# Patient Record
Sex: MALE | Race: BLACK OR AFRICAN AMERICAN | NOT HISPANIC OR LATINO | Employment: FULL TIME | URBAN - METROPOLITAN AREA
[De-identification: names, ages, dates, MRNs, and addresses within clinical notes are randomized per-mention and may not be internally consistent; named-entity substitution may affect disease eponyms.]

---

## 2017-08-11 ENCOUNTER — HOSPITAL ENCOUNTER (OUTPATIENT)
Dept: ADMINISTRATIVE | Facility: HOSPITAL | Age: 57
End: 2017-08-13
Attending: INTERNAL MEDICINE | Admitting: INTERNAL MEDICINE

## 2017-08-12 LAB
ABS NEUT (OLG): 6.42 X10(3)/MCL (ref 2.1–9.2)
ALBUMIN SERPL-MCNC: 3.5 GM/DL (ref 3.4–5)
ALBUMIN/GLOB SERPL: 1 {RATIO}
ALP SERPL-CCNC: 49 UNIT/L (ref 45–117)
ALT SERPL-CCNC: 97 UNIT/L (ref 16–61)
AST SERPL-CCNC: 40 UNIT/L (ref 15–37)
BASOPHILS # BLD AUTO: 0.02 X10(3)/MCL (ref 0–0.2)
BASOPHILS NFR BLD AUTO: 0.2 % (ref 0–0.9)
BILIRUB SERPL-MCNC: 0.8 MG/DL (ref 0.2–1)
BILIRUBIN DIRECT+TOT PNL SERPL-MCNC: 0.2 MG/DL (ref 0–0.2)
BILIRUBIN DIRECT+TOT PNL SERPL-MCNC: 0.6 MG/DL (ref 0–1)
BUN SERPL-MCNC: 50 MG/DL (ref 7–18)
BUN SERPL-MCNC: 59 MG/DL (ref 7–18)
BUN SERPL-MCNC: 66 MG/DL (ref 7–18)
CALCIUM SERPL-MCNC: 8.2 MG/DL (ref 8.5–10.1)
CALCIUM SERPL-MCNC: 8.2 MG/DL (ref 8.5–10.1)
CALCIUM SERPL-MCNC: 8.3 MG/DL (ref 8.5–10.1)
CHLORIDE SERPL-SCNC: 106 MMOL/L (ref 98–107)
CHLORIDE SERPL-SCNC: 107 MMOL/L (ref 98–107)
CHLORIDE SERPL-SCNC: 109 MMOL/L (ref 98–107)
CO2 SERPL-SCNC: 26 MMOL/L (ref 21–32)
CREAT SERPL-MCNC: 1.55 MG/DL (ref 0.7–1.3)
CREAT SERPL-MCNC: 1.95 MG/DL (ref 0.7–1.3)
CREAT SERPL-MCNC: 2.24 MG/DL (ref 0.7–1.3)
EOSINOPHIL # BLD AUTO: 0.28 X10(3)/MCL (ref 0–0.9)
EOSINOPHIL NFR BLD AUTO: 2.8 % (ref 0–6.5)
ERYTHROCYTE [DISTWIDTH] IN BLOOD BY AUTOMATED COUNT: 11.9 % (ref 11.5–17)
EST. AVERAGE GLUCOSE BLD GHB EST-MCNC: 160 MG/DL
GLOBULIN SER-MCNC: 4 GM/DL (ref 2–4)
GLUCOSE SERPL-MCNC: 130 MG/DL (ref 74–106)
GLUCOSE SERPL-MCNC: 192 MG/DL (ref 74–106)
GLUCOSE SERPL-MCNC: 99 MG/DL (ref 74–106)
HBA1C MFR BLD: 7.2 % (ref 4.2–6.3)
HCT VFR BLD AUTO: 29.7 % (ref 42–52)
HGB BLD-MCNC: 9.6 GM/DL (ref 14–18)
IMM GRANULOCYTES # BLD AUTO: 0.04 10*3/UL (ref 0–0.02)
IMM GRANULOCYTES NFR BLD AUTO: 0.4 % (ref 0–0.43)
LYMPHOCYTES # BLD AUTO: 2.7 X10(3)/MCL (ref 0.6–4.6)
LYMPHOCYTES NFR BLD AUTO: 26.9 % (ref 16.2–38.3)
MAGNESIUM SERPL-MCNC: 3 MG/DL (ref 1.8–2.4)
MCH RBC QN AUTO: 30.8 PG (ref 27–31)
MCHC RBC AUTO-ENTMCNC: 32.3 GM/DL (ref 33–36)
MCV RBC AUTO: 95.2 FL (ref 80–94)
MONOCYTES # BLD AUTO: 0.56 X10(3)/MCL (ref 0.1–1.3)
MONOCYTES NFR BLD AUTO: 5.6 % (ref 4.7–11.3)
NEUTROPHILS # BLD AUTO: 6.42 X10(3)/MCL (ref 2.1–9.2)
NEUTROPHILS NFR BLD AUTO: 64.1 % (ref 49.1–73.4)
NRBC BLD AUTO-RTO: 0 % (ref 0–0.2)
PLATELET # BLD AUTO: 186 X10(3)/MCL (ref 130–400)
PMV BLD AUTO: 9.4 FL (ref 7.4–10.4)
POTASSIUM SERPL-SCNC: 5.3 MMOL/L (ref 3.5–5.1)
POTASSIUM SERPL-SCNC: 5.7 MMOL/L (ref 3.5–5.1)
POTASSIUM SERPL-SCNC: 6.2 MMOL/L (ref 3.5–5.1)
PROT SERPL-MCNC: 7 GM/DL (ref 6.4–8.2)
RBC # BLD AUTO: 3.12 X10(6)/MCL (ref 4.7–6.1)
SODIUM SERPL-SCNC: 136 MMOL/L (ref 136–145)
SODIUM SERPL-SCNC: 137 MMOL/L (ref 136–145)
SODIUM SERPL-SCNC: 139 MMOL/L (ref 136–145)
WBC # SPEC AUTO: 10 X10(3)/MCL (ref 4.5–11.5)

## 2017-08-13 LAB
ABS NEUT (OLG): 6.3 X10(3)/MCL (ref 2.1–9.2)
BASOPHILS # BLD AUTO: 0 X10(3)/MCL (ref 0–0.2)
BASOPHILS NFR BLD AUTO: 0 %
BUN SERPL-MCNC: 35 MG/DL (ref 7–18)
CALCIUM SERPL-MCNC: 8.5 MG/DL (ref 8.5–10.1)
CHLORIDE SERPL-SCNC: 106 MMOL/L (ref 98–107)
CHOLEST SERPL-MCNC: 102 MG/DL (ref 0–200)
CHOLEST/HDLC SERPL: 2.7 {RATIO} (ref 0–5)
CK SERPL-CCNC: 108 UNIT/L (ref 39–308)
CO2 SERPL-SCNC: 25 MMOL/L (ref 21–32)
CREAT SERPL-MCNC: 1.14 MG/DL (ref 0.7–1.3)
EOSINOPHIL # BLD AUTO: 0.2 X10(3)/MCL (ref 0–0.9)
EOSINOPHIL NFR BLD AUTO: 2 %
ERYTHROCYTE [DISTWIDTH] IN BLOOD BY AUTOMATED COUNT: 11.6 % (ref 11.5–17)
FOLATE SERPL-MCNC: 16.4 NG/ML (ref 3.1–17.5)
GLUCOSE SERPL-MCNC: 90 MG/DL (ref 74–106)
HAV IGM SERPL QL IA: NORMAL
HBV CORE IGM SERPL QL IA: NORMAL
HBV SURFACE AG SERPL QL IA: NORMAL
HCT VFR BLD AUTO: 29.7 % (ref 42–52)
HCV AB SERPL QL IA: NORMAL
HDLC SERPL-MCNC: 38 MG/DL (ref 35–60)
HGB BLD-MCNC: 9.6 GM/DL (ref 14–18)
IRON SATN MFR SERPL: 73.5 % (ref 20–50)
IRON SERPL-MCNC: 175 MCG/DL (ref 50–175)
LDLC SERPL CALC-MCNC: 49 MG/DL (ref 0–129)
LYMPHOCYTES # BLD AUTO: 3.2 X10(3)/MCL (ref 0.6–4.6)
LYMPHOCYTES NFR BLD AUTO: 31 %
MAGNESIUM SERPL-MCNC: 2.5 MG/DL (ref 1.8–2.4)
MCH RBC QN AUTO: 30.8 PG (ref 27–31)
MCHC RBC AUTO-ENTMCNC: 32.3 GM/DL (ref 33–36)
MCV RBC AUTO: 95.2 FL (ref 80–94)
MONOCYTES # BLD AUTO: 0.5 X10(3)/MCL (ref 0.1–1.3)
MONOCYTES NFR BLD AUTO: 5 %
NEUTROPHILS # BLD AUTO: 6.3 X10(3)/MCL (ref 2.1–9.2)
NEUTROPHILS NFR BLD AUTO: 61 %
PLATELET # BLD AUTO: 188 X10(3)/MCL (ref 130–400)
PMV BLD AUTO: 10.3 FL (ref 9.4–12.4)
POTASSIUM SERPL-SCNC: 4.9 MMOL/L (ref 3.5–5.1)
RBC # BLD AUTO: 3.12 X10(6)/MCL (ref 4.7–6.1)
SODIUM SERPL-SCNC: 138 MMOL/L (ref 136–145)
TIBC SERPL-MCNC: 238 MCG/DL (ref 250–450)
TRANSFERRIN SERPL-MCNC: 203 MG/DL (ref 200–360)
TRIGL SERPL-MCNC: 76 MG/DL (ref 30–150)
VIT B12 SERPL-MCNC: 1112 PG/ML (ref 193–986)
VLDLC SERPL CALC-MCNC: 15 MG/DL
WBC # SPEC AUTO: 10.3 X10(3)/MCL (ref 4.5–11.5)

## 2022-04-10 ENCOUNTER — HISTORICAL (OUTPATIENT)
Dept: ADMINISTRATIVE | Facility: HOSPITAL | Age: 62
End: 2022-04-10

## 2022-04-26 VITALS
WEIGHT: 182 LBS | SYSTOLIC BLOOD PRESSURE: 138 MMHG | BODY MASS INDEX: 29.25 KG/M2 | DIASTOLIC BLOOD PRESSURE: 92 MMHG | HEIGHT: 66 IN

## 2022-04-30 NOTE — DISCHARGE SUMMARY
Patient:   Paco Chow             MRN: [id number]            FIN: [id number]               Age:   57 years     Sex:  Male     :  1960   Associated Diagnoses:   None   Author:   Isaura Briceno MD      Basic Information   Source of history:  Self, Medical record.    Referral source:  Emergency department, Dr. Nazario.    History limitation:  None.       Chief Complaint   abnormal blood work      History of Present Illness   17: Mr. Chow is a 57 year old male with a history of congestive heart failure.  He had routine blood work done by his cardiologist, and he was found to be in acute renal failure and to have hyperkalemia.  He states that he did not have any symptoms like, weakness, shortness of breath, or palpitaions.  17:  He has been getting IV fluids at a very low rate.  No shortness of breath.  No edema.  Kidney function is back to normal.  Electrolytes are normal           Review of Systems   Constitutional:  Fatigue, No fever, No weakness.    Eye:  Negative.    Ear/Nose/Mouth/Throat   Respiratory:  No shortness of breath, No cough, No sputum production.    Cardiovascular:  No chest pain, No palpitations, No tachycardia.    Gastrointestinal:  No nausea, No vomiting, No diarrhea.    Genitourinary:  No dysuria.    Hematology/Lymphatics:  Negative.    Endocrine:  No excessive thirst, No polyuria.    Immunologic:  Negative.    Musculoskeletal:  No joint pain.    Integumentary:  Negative.    Neurologic:  Alert and oriented X4, No confusion, No numbness.    Psychiatric:  No anxiety, No depression.       Health Status   Allergies:    Allergic Reactions (Selected)  No Known Allergies,    Allergies (1) Active Reaction  No Known Allergies None Documented     Current medications:  (Selected)   Inpatient Medications  Ordered  Coreg 3.125 mg oral tablet: 3.125 mg, form: Tab, Oral, BID, first dose 17 21:00:00 CDT, 24,034  Dextrose 50% and Water  (50 mL vial/syringe): 25 mL, 12.5 gm =,  form: Injection, IV Push, As Directed PRN for blood glucose, Infuse over: 5 minute(s), first dose 08/12/17 4:52:00 CDT, Unconscious patient: Repeat as ordered per protocol.  Dextrose 50% and Water  (50 mL vial/syringe): 25 mL, 12.5 gm =, form: Injection, IV Push, Once PRN for blood glucose, Infuse over: 5 minute(s), first dose 08/12/17 4:52:00 CDT, Unconscious patient: Look for other source of altered mental status.  Dextrose 50% and Water  (50 mL vial/syringe): 50 mL, 25 gm =, form: Injection, IV Push, As Directed PRN for blood glucose, Infuse over: 5 minute(s), first dose 08/12/17 4:52:00 CDT, Unconscious patient: Repeat as ordered per protocol.  Dextrose 50% in Water: 25 mL, 12.5 gm =, form: Injection, IV Push, As Directed PRN for blood glucose, Infuse over: 5 minute(s), first dose 08/12/17 4:52:00 CDT, Conscious patient.  Eliquis: 5 mg, form: Tab, Oral, BID, first dose 08/12/17 21:00:00 CDT, 24,034  Flomax 0.4 mg oral capsule: 0.4 mg, form: Cap, Oral, qPM, first dose 08/12/17 21:00:00 CDT, 26,038  Lasix: 20 mg, form: Tab, Oral, Daily, first dose 08/15/17 9:00:00 CDT, 23  Lipitor: 20 mg, form: Tab, Oral, At Bedtime, first dose 08/13/17 21:00:00 CDT, 62,027  Protonix: 40 mg, form: Tab-EC, Oral, Daily, first dose 08/13/17 6:00:00 CDT, 66,022  aspirin 81 mg oral Delayed Release (EC) tablet: 81 mg, form: Tab-EC, Oral, Daily, first dose 08/13/17 9:00:00 CDT, 23  cloNIDine: 0.2 mg, form: Tab, Oral, q8hr PRN for hypertension, first dose 08/12/17 4:52:00 CDT, SBP > _160___, 30,023  glimepiride 1 mg oral tablet: 1 mg, form: Tab, Oral, BID, first dose 08/13/17 21:00:00 CDT, 24,034  glucagon: 1 mg, form: Injection, IM, q10min PRN for blood glucose, first dose 08/12/17 4:52:00 CDT, Conscious Patient with NO IV access available and BG < 45 mg/dl., 58  glucagon: 1 mg, form: Injection, IM, q10min PRN for blood glucose, first dose 08/12/17 4:52:00 CDT, Unconscious patient: Patient with NO IV access available and BG < 70 mg/dl.,  58  insulin lispro: 2-14 units, form: Injection, Subcutaneous, As Directed PRN for blood glucose, first dose 08/12/17 4:52:00 CDT  lisinopril 5 mg oral tablet: 2.5 mg, form: Tab, Oral, Daily, first dose 08/14/17 9:00:00 CDT, 23  Pending Complete  Ancef: 1 gm, form: Injection, IV Piggyback, q8hr, Infuse over: 30 minute(s), Order duration: 2 dose(s), first dose 10/10/16 17:00:00 CDT, stop date 10/11/16 8:59:00 CDT, 30,023  Prescriptions  Prescribed  Coreg 3.125 mg oral tablet: 3.125 mg = 1 tab(s), Oral, BID, # 60 tab(s), 4 Refill(s)  Eliquis 5 mg oral tablet: 5 mg = 1 tab(s), Oral, BID, # 60 tab(s), 0 Refill(s)  Lasix 20 mg oral tablet: 20 mg = 1 tab(s), Oral, Daily, # 30 tab(s), 4 Refill(s)  Lipitor 20 mg oral tablet: 20 mg = 1 tab(s), Oral, At Bedtime, # 30 tab(s), 4 Refill(s)  Protonix 40 mg ORAL enteric coated tablet: 40 mg = 1 tab(s), Oral, Daily, # 30 tab(s), 4 Refill(s)  glimepiride 1 mg oral tablet: 1 mg = 1 tab(s), Oral, BID, # 60 tab(s), 4 Refill(s)  lisinopril 2.5 mg oral tablet: 2.5 mg = 1 tab(s), Oral, Daily, # 30 tab(s), 4 Refill(s)  Documented Medications  Documented  aspirin 81 mg oral Delayed Release (EC) tablet: 81 mg = 1 tab(s), Oral, Daily, 0 Refill(s)  tamsulosin 0.4 mg oral capsule: 0.4 mg = 1 cap(s), Oral, Daily, 0 Refill(s),    Medications (17) Active  Scheduled: (9)  apixaban 5 mg Tab  5 mg 1 tab(s), Oral, BID  aspirin 81 mg EC Tab  81 mg 1 tab(s), Oral, Daily  atorvastatin 20 mg Tab  20 mg 1 tab(s), Oral, At Bedtime  carvedilol 3.125 mg Tab UD  3.125 mg 1 tab(s), Oral, BID  furosemide 20 mg tab UD  20 mg 1 tab(s), Oral, Daily  glimepiride 1 mg Tab  1 mg 1 tab(s), Oral, BID  lisinopril 5 mg Tab UD  2.5 mg 0.5 tab(s), Oral, Daily  pantoprazole 40 mg EC Tab  40 mg 1 tab(s), Oral, Daily  tamsulosin 0.4 mg Cap  0.4 mg 1 cap(s), Oral, qPM  Continuous: (0)  PRN: (8)  cloniDINE 0.1 mg Tab UD  0.2 mg 2 tab(s), Oral, q8hr  dextrose 50% abboj  12.5 gm 25 mL, IV Push, As Directed  dextrose 50% abboj   12.5 gm 25 mL, IV Push, Once  dextrose 50% abboj  12.5 gm 25 mL, IV Push, As Directed  dextrose 50% abboj  25 gm 50 mL, IV Push, As Directed  glucagon recombinant 1 mg Inj  1 mg 1 EA, IM, q10min  glucagon recombinant 1 mg Inj  1 mg 1 EA, IM, q10min  insulin (Humalog) lispro 100 u/ml Inj  2-14 units, Subcutaneous, As Directed     Problem list:    All Problems  CHF (congestive heart failure) / SNOMED CT [id number] / Confirmed  CVA (cerebral vascular accident) / SNOMED CT [id number] / Confirmed  Diabetes / SNOMED CT [id number] / Confirmed  Hypertension / SNOMED CT [id number] / Confirmed  Ventricular tachycardia / SNOMED CT [id number] / Confirmed  Canceled: Benign hypertension / SNOMED CT [id number],    Active Problems (5)  CHF (congestive heart failure)   CVA (cerebral vascular accident)   Diabetes   Hypertension   Ventricular tachycardia         Histories   Family History:    Primary malignant neoplasm of breast  Mother  Diabetes mellitus type 2  Mother  Asthma.  Brother  Hypertension.  Mother  Renal failure syndrome.  Mother     Procedure history:    Colonoscopy ([id number]).  Comments:  5/1/2016 00:07 - Torin ROLLINS, Madai MOISE  in 2015  pacemaker.   Social History        Social & Psychosocial Habits    Alcohol  04/30/2016  Use: Never    Employment/School  04/30/2016  Status: Employed    Home/Environment  04/30/2016  Lives with: Spouse    Alcohol abuse in household: No    Substance abuse in household: No    Smoker in household: No    Injuries/Abuse/Neglect in household: No    Feels unsafe at home: No    Safe place to go: Yes    Agency(s)/Others notified: No    Family/Friends available to help: No    Concern for family members at home: No    Nutrition/Health  04/30/2016  Type of diet: Regular    Substance Abuse  04/30/2016  Use: Never    Tobacco  04/30/2016  Use: Never smoker  .        Physical  Examination   General:  Alert and oriented, No acute distress.    Eye:  Extraocular movements are intact, Normal conjunctiva.    HENT:  Normocephalic.    Neck:  Supple, Non-tender, No jugular venous distention.    Respiratory:  Lungs are clear to auscultation, Respirations are non-labored.    Cardiovascular:  Normal rate, Regular rhythm, No edema.    Gastrointestinal:  Soft, Non-tender, Non-distended, Normal bowel sounds.       Vital Signs (last 24 hrs)_____  Last Charted___________  Temp Oral     36.3 DegC  (AUG 13 11:00)  Resp Rate         18 br/min  (AUG 13 11:00)  SBP      113 mmHg  (AUG 13 11:00)  DBP      L 56mmHg  (AUG 13 11:00)  SpO2      100 %  (AUG 13 11:00)     Musculoskeletal:  Normal range of motion, Normal strength, No swelling.    Integumentary:  Warm, Dry.    Neurologic:  Alert, Oriented, No focal deficits.    Psychiatric:  Cooperative, Appropriate mood & affect.    Cognition and Speech:  Oriented, Speech clear and coherent.       Health Maintenance      Health Maintenance     Pending (in the next year)        Due            Alcohol Misuse Screening due  08/13/17  and every 1  year(s)           Colorectal Screening due  08/13/17  Variable frequency           Depression Screening due  08/13/17  and every 1  year(s)           HIV Screening due  08/13/17  and every 1  year(s)           Tetanus Vaccine due  08/13/17  and every 10  year(s)        Due In Future            Influenza Vaccine not due until  10/02/17  and every 1  year(s)           Body Mass Index Check not due until  10/10/17  and every 1  year(s)           Obesity Screening not due until  10/10/17  and every 1  year(s)     Satisfied (in the past 1 year)        Satisfied            Aspirin Therapy for CVD Prevention on  08/13/17.  Satisfied by Cynthia New RN           Blood Pressure Screening on  08/13/17.  Satisfied by Chanel Hernández C.N.A.           Body Mass Index Check on  10/10/16.  Satisfied by Shannan Martínez RN           Diabetes  Screening on  17.  Satisfied by Katrina Navarro.           Lipid Screening on  17.  Satisfied by Katrina Navarro.           Obesity Screening on  10/10/16.  Satisfied by Mauricio RNShannan          Review / Management   Results review:     Labs (Last four charted values)  Glucose              90 (AUG 13) H 130 (AUG 12) 99 (AUG 12) H 192 (AUG 12) .       Impression and Plan   1. Chronic systolic CHF:  He feels well.  He does not appear to be in any volume overload.  Will resume low dose betablocker and ace-i  2. Acute renal failure: resolved after hydration and decrease in lasix  3.  Hyperkalemia: resolved  4.  DM2:  hgba1c is 7.2. Will change glimeperide to 1mg bid  5.  Volume depletion: resolving    Followup with Dr. Nazario in 1-2 weeks    Total time with discharge plannin minutes  Discharge to home in improved condition  Discharge activity: as tolerated  Discharge diet: diabetic/cardiac  Discharge meds: see discharge med rec

## 2022-04-30 NOTE — ED PROVIDER NOTES
Patient:   Paco Chow             MRN: [id number]            FIN: [id number]               Age:   57 years     Sex:  Male     :  1960   Associated Diagnoses:   Renal failure; Hyperkalemia; Dehydration   Author:   Pavel Cavazos MD      Basic Information   Time seen: Immediately upon arrival.   History source: Patient.   Arrival mode: Private vehicle, walking.   History limitation: None.   Additional information: Chief Complaint from Nursing Triage Note : Chief Complaint   2017 21:44 CDT      Chief Complaint           Denies symptoms; routine labs showed elevated potassium, BUN, and creatinine; reffered by MD Nazario  .      History of Present Illness   The patient presents with abnormal lab test and hyperkalemia.  The onset was unknown.  Lab test value K: 6.2 mEq/L, Lab test was performed by: primary care physician, Patient was notified of lab results by: doctor's office Cr 2.4.  Associated symptoms: none.  Risk factors consist of diabetes mellitus and hypertension.  Therapy today: none.  pt found to have elevated potassium and creatnine on routine blood work. he wa sent to ED for hydration. pt does not have any complaints. he was instructed to stop aldactone, lisinopril and potassium as well.        Review of Systems   Constitutional symptoms:  Negative except as documented in HPI.   Skin symptoms:  Negative except as documented in HPI.   Eye symptoms:  Negative except as documented in HPI.   ENMT symptoms:  Negative except as documented in HPI.   Respiratory symptoms:  Negative except as documented in HPI.   Cardiovascular symptoms:  Negative except as documented in HPI.   Gastrointestinal symptoms:  Negative except as documented in HPI.   Genitourinary symptoms:  Negative except as documented in HPI.   Musculoskeletal symptoms:  Negative except as documented in HPI.   Neurologic symptoms:  Negative except as documented in HPI.   Psychiatric symptoms:  Negative except as documented  in HPI.   Endocrine symptoms:  Negative except as documented in HPI.   Hematologic/Lymphatic symptoms:  Negative except as documented in HPI.   Allergy/immunologic symptoms:  Negative except as documented in HPI.             Additional review of systems information: All other systems reviewed and otherwise negative.      Health Status   Allergies:    Allergic Reactions (Selected)  No Known Allergies,    Allergies (1) Active Reaction  No Known Allergies None Documented  .   Medications:  (Selected)   Inpatient Medications  Pending Complete  Ancef: 1 gm, form: Injection, IV Piggyback, q8hr, Infuse over: 30 minute(s), Order duration: 2 dose(s), first dose 10/10/16 17:00:00 CDT, stop date 10/11/16 8:59:00 CDT, 30,023  Prescriptions  Prescribed  Eliquis 5 mg oral tablet: 5 mg = 1 tab(s), Oral, BID, # 60 tab(s), 0 Refill(s)  Lipitor 40 mg oral tablet: 40 mg = 1 tab(s), Oral, Daily, # 30 tab(s), 1 Refill(s)  glimepiride 1 mg oral tablet: 1 mg = 1 tab(s), Oral, Daily, # 30 tab(s), 1 Refill(s)  Documented Medications  Documented  Lasix 40 mg oral tablet: 40 mg = 1 tab(s), Oral, Daily, # 30 tab(s), 0 Refill(s)  aspirin 81 mg oral Delayed Release (EC) tablet: 81 mg = 1 tab(s), Oral, Daily, 0 Refill(s)  carvedilol 12.5 mg oral tablet: 12.5 mg = 1 tab(s), Oral, BID, # 60 tab(s), 0 Refill(s)  lisinopril 40 mg oral tablet: 40 mg = 1 tab(s), Oral, Daily, # 30 tab(s), 0 Refill(s)  potassium chloride 10 mEq oral TABLET extended release: 10 mEq = 1 tab(s), Oral, BID, 0 Refill(s)  tamsulosin 0.4 mg oral capsule: 0.4 mg = 1 cap(s), Oral, Daily, 0 Refill(s).      Past Medical/ Family/ Social History   Family history:    Primary malignant neoplasm of breast  Mother  Diabetes mellitus type 2  Mother  Asthma.  Brother  Hypertension.  Mother  Renal failure syndrome.  Mother  .   Social history: Alcohol use: Denies, Tobacco use: Denies, Drug use: Denies.      Physical Examination               Vital Signs   Vital Signs   8/11/2017 21:44 CDT       Temperature Oral          36.8 DegC                             Peripheral Pulse Rate     74 bpm                             Respiratory Rate          17 br/min                             SpO2                      97 %                             Oxygen Therapy            Room air                             Systolic Blood Pressure   105 mmHg                             Diastolic Blood Pressure  54 mmHg  LOW  .   Measurements   8/11/2017 21:44 CDT      Weight Dosing             81 kg                             Weight Measured and Calculated in Lbs     178.57 lb                             Weight Estimated          81 kg                             Height/Length Estimated   172 cm                             Body Mass Index Estimated 27.38 kg/m2  .   General:  Alert, no acute distress.    Skin:  Warm, dry, intact, no rash.    Head:  Atraumatic.   Neck:  Supple.   Eye:  Normal conjunctiva.   Ears, nose, mouth and throat:  Oral mucosa moist.   Cardiovascular:  Regular rate and rhythm, No murmur, Normal peripheral perfusion, No edema.    Respiratory:  Lungs are clear to auscultation, respirations are non-labored.    Gastrointestinal:  Soft, Nontender, Non distended, Normal bowel sounds, Guarding: Negative, Rebound: Negative.    Musculoskeletal:  Normal ROM, normal strength.    Neurological:  Alert and oriented to person, place, time, and situation, No focal neurological deficit observed.       Medical Decision Making   Documents reviewed:  Emergency department nurses' notes, prior records.    Orders  Launch Orders   Laboratory:  BMP (Order): Stat collect, 8/11/2017 23:45 CDT, Blood, Lab Collect, 8/11/2017 23:45 CDT, Launch Orders   Admit/Transfer/Discharge:  Admit to Outpatient Observation (Order): 8/12/2017 3:54 CDT, Briceno Isaura ARENAS  Medical Unit, No, Launch Orders   Pharmacy:  Kayexalate (Order): 30 gm, form: Susp, Oral, Once, first dose 8/12/2017 4:57 CDT, stop date 8/12/2017 4:57 CDT, 24.    Cardiac monitor:   Normal sinus rhythm.   Electrocardiogram:  Time 8/12/2017 04:01:00, rate 61, normal sinus rhythm, No ST-T changes, no ectopy, normal [place] & QRS intervals, EP Interp.    Results review:  Lab results : Lab View   8/12/2017 3:22 CDT       Potassium Lvl             6.2 mmol/L  HI                             Creatinine                1.95 mg/dL  HI    8/12/2017 0:02 CDT       Potassium Lvl             5.7 mmol/L  HI                             Creatinine                2.24 mg/dL  HI    8/11/2017 18:20 CDT      Potassium Lvl             6.12 mmol/L  HI                             Creatinine                2.41 mg/dL  HI  .      Reexamination/ Reevaluation   Time: 8/12/2017 02:10:00 .   Notes: no resp distress. lungs clear.   Time: 8/12/2017 03:50:00 .   Notes: pt's renal function improved but his potassium has increased after 1L NS. pt will be admitted for continued hydration.      Impression and Plan   Diagnosis   Renal failure (YPX76-FS N19)   Hyperkalemia (DGA67-FU E87.5)   Dehydration (NHW51-MI E86.0)      Calls-Consults   -  8/12/2017 00:44:00 , Anurag Duff, recommends goes to medicine.    -  8/12/2017 03:56:00 , Janak ARENAS, Isaura MOISE, recommends accepts.    Plan   Condition: Stable.    Disposition: Admit time  8/12/2017 03:56:00, Place in Observation Unit.

## 2022-04-30 NOTE — H&P
Patient:   Paco Chow             MRN: [id number]            FIN: [id number]               Age:   57 years     Sex:  Male     :  1960   Associated Diagnoses:   None   Author:   Isaura Briceno MD      Basic Information   Source of history:  Self, Medical record.    Referral source:  Emergency department, Dr. Nazario.    History limitation:  None.       Chief Complaint   abnormal blood work      History of Present Illness   Mr. Chow is a 57 year old male with a history of congestive heart failure.  He had routine blood work done by his cardiologist, and he was found to be in acute renal failure and to have hyperkalemia.  He states that he did not have any symptoms like, weakness, shortness of breath, or palpitaions.      Review of Systems   Constitutional:  Fatigue, No fever, No weakness.    Eye:  Negative.    Ear/Nose/Mouth/Throat   Respiratory:  No shortness of breath, No cough, No sputum production.    Cardiovascular:  No chest pain, No palpitations, No tachycardia.    Gastrointestinal:  No nausea, No vomiting, No diarrhea.    Genitourinary:  No dysuria.    Hematology/Lymphatics:  Negative.    Endocrine:  No excessive thirst, No polyuria.    Immunologic:  Negative.    Musculoskeletal:  No joint pain.    Integumentary:  Negative.    Neurologic:  Alert and oriented X4, No confusion, No numbness.    Psychiatric:  No anxiety, No depression.       Health Status   Allergies:    Allergic Reactions (Selected)  No Known Allergies,    Allergies (1) Active Reaction  No Known Allergies None Documented     Current medications:  (Selected)   Inpatient Medications  Ordered  1/2 Normal Saline (0.45% NS) 1,000 mL: 1,000 mL, 1,000 mL, IV, 50 mL/hr, start date 17 20:07:00 CDT  Coreg 3.125 mg oral tablet: 3.125 mg, form: Tab, Oral, BID, first dose 17 21:00:00 CDT, 24,034  Dextrose 50% and Water  (50 mL vial/syringe): 25 mL, 12.5 gm =, form: Injection, IV Push, As Directed PRN for blood glucose,  Infuse over: 5 minute(s), first dose 08/12/17 4:52:00 CDT, Unconscious patient: Repeat as ordered per protocol.  Dextrose 50% and Water  (50 mL vial/syringe): 25 mL, 12.5 gm =, form: Injection, IV Push, Once PRN for blood glucose, Infuse over: 5 minute(s), first dose 08/12/17 4:52:00 CDT, Unconscious patient: Look for other source of altered mental status.  Dextrose 50% and Water  (50 mL vial/syringe): 50 mL, 25 gm =, form: Injection, IV Push, As Directed PRN for blood glucose, Infuse over: 5 minute(s), first dose 08/12/17 4:52:00 CDT, Unconscious patient: Repeat as ordered per protocol.  Dextrose 50% in Water: 25 mL, 12.5 gm =, form: Injection, IV Push, As Directed PRN for blood glucose, Infuse over: 5 minute(s), first dose 08/12/17 4:52:00 CDT, Conscious patient.  Eliquis: 5 mg, form: Tab, Oral, BID, first dose 08/12/17 21:00:00 CDT, 24,034  Flomax 0.4 mg oral capsule: 0.4 mg, form: Cap, Oral, qPM, first dose 08/12/17 21:00:00 CDT, 26,038  Lipitor: 40 mg, form: Tab, Oral, At Bedtime, first dose 08/12/17 21:00:00 CDT, 62,027  Protonix: 40 mg, form: Tab-EC, Oral, Daily, first dose 08/13/17 6:00:00 CDT, 66,022  aspirin 81 mg oral Delayed Release (EC) tablet: 81 mg, form: Tab-EC, Oral, Daily, first dose 08/13/17 9:00:00 CDT, 23  cloNIDine: 0.2 mg, form: Tab, Oral, q8hr PRN for hypertension, first dose 08/12/17 4:52:00 CDT, SBP > _160___, 30,023  glimepiride 1 mg oral tablet: 1 mg, form: Tab, Oral, Daily, first dose 08/13/17 9:00:00 CDT, 23  glucagon: 1 mg, form: Injection, IM, q10min PRN for blood glucose, first dose 08/12/17 4:52:00 CDT, Conscious Patient with NO IV access available and BG < 45 mg/dl., 58  glucagon: 1 mg, form: Injection, IM, q10min PRN for blood glucose, first dose 08/12/17 4:52:00 CDT, Unconscious patient: Patient with NO IV access available and BG < 70 mg/dl., 58  insulin lispro: 2-14 units, form: Injection, Subcutaneous, As Directed PRN for blood glucose, first dose 08/12/17 4:52:00 CDT  Pending  Complete  Ancef: 1 gm, form: Injection, IV Piggyback, q8hr, Infuse over: 30 minute(s), Order duration: 2 dose(s), first dose 10/10/16 17:00:00 CDT, stop date 10/11/16 8:59:00 CDT, 30,023  Prescriptions  Prescribed  Eliquis 5 mg oral tablet: 5 mg = 1 tab(s), Oral, BID, # 60 tab(s), 0 Refill(s)  Lipitor 40 mg oral tablet: 40 mg = 1 tab(s), Oral, Daily, # 30 tab(s), 1 Refill(s)  glimepiride 1 mg oral tablet: 1 mg = 1 tab(s), Oral, Daily, # 30 tab(s), 1 Refill(s)  Documented Medications  Documented  Lasix 40 mg oral tablet: 40 mg = 1 tab(s), Oral, Daily, # 30 tab(s), 0 Refill(s)  aspirin 81 mg oral Delayed Release (EC) tablet: 81 mg = 1 tab(s), Oral, Daily, 0 Refill(s)  carvedilol 12.5 mg oral tablet: 12.5 mg = 1 tab(s), Oral, BID, # 60 tab(s), 0 Refill(s)  lisinopril 40 mg oral tablet: 40 mg = 1 tab(s), Oral, Daily, # 30 tab(s), 0 Refill(s)  potassium chloride 10 mEq oral TABLET extended release: 10 mEq = 1 tab(s), Oral, Daily, 0 Refill(s)  tamsulosin 0.4 mg oral capsule: 0.4 mg = 1 cap(s), Oral, Daily, 0 Refill(s),    Medications (16) Active  Scheduled: (7)  apixaban 5 mg Tab  5 mg 1 tab(s), Oral, BID  aspirin 81 mg EC Tab  81 mg 1 tab(s), Oral, Daily  atorvastatin 20 mg Tab  40 mg 2 tab(s), Oral, At Bedtime  carvedilol 3.125 mg Tab UD  3.125 mg 1 tab(s), Oral, BID  glimepiride 1 mg Tab  1 mg 1 tab(s), Oral, Daily  pantoprazole 40 mg EC Tab  40 mg 1 tab(s), Oral, Daily  tamsulosin 0.4 mg Cap  0.4 mg 1 cap(s), Oral, qPM  Continuous: (1)  sodium chloride 0.45% 1,000 mL  1,000 mL, IV, 50 mL/hr  PRN: (8)  cloniDINE 0.1 mg Tab UD  0.2 mg 2 tab(s), Oral, q8hr  dextrose 50% abboj  12.5 gm 25 mL, IV Push, As Directed  dextrose 50% abboj  12.5 gm 25 mL, IV Push, Once  dextrose 50% abboj  12.5 gm 25 mL, IV Push, As Directed  dextrose 50% abboj  25 gm 50 mL, IV Push, As Directed  glucagon recombinant 1 mg Inj  1 mg 1 EA, IM, q10min  glucagon recombinant 1 mg Inj  1 mg 1 EA, IM, q10min  insulin (Humalog) lispro 100 u/ml Inj  2-14  units, Subcutaneous, As Directed     Problem list:    All Problems  CHF (congestive heart failure) / SNOMED CT [id number] / Confirmed  CVA (cerebral vascular accident) / SNOMED CT [id number] / Confirmed  Diabetes / SNOMED CT [id number] / Confirmed  Hypertension / SNOMED CT [id number] / Confirmed  Ventricular tachycardia / SNOMED CT [id number] / Confirmed,    Active Problems (5)  CHF (congestive heart failure)   CVA (cerebral vascular accident)   Diabetes   Hypertension   Ventricular tachycardia         Histories   Family History:    Primary malignant neoplasm of breast  Mother  Diabetes mellitus type 2  Mother  Asthma.  Brother  Hypertension.  Mother  Renal failure syndrome.  Mother     Procedure history:    Colonoscopy ([id number]).  Comments:  5/1/2016 00:07 - Torin ROLLINS, Madai MOISE  in 2015  pacemaker.   Social History        Social & Psychosocial Habits    Alcohol  04/30/2016  Use: Never    Employment/School  04/30/2016  Status: Employed    Home/Environment  04/30/2016  Lives with: Spouse    Alcohol abuse in household: No    Substance abuse in household: No    Smoker in household: No    Injuries/Abuse/Neglect in household: No    Feels unsafe at home: No    Safe place to go: Yes    Agency(s)/Others notified: No    Family/Friends available to help: No    Concern for family members at home: No    Nutrition/Health  04/30/2016  Type of diet: Regular    Substance Abuse  04/30/2016  Use: Never    Tobacco  04/30/2016  Use: Never smoker  .        Physical Examination   General:  Alert and oriented, No acute distress.    Eye:  Extraocular movements are intact, Normal conjunctiva.    HENT:  Normocephalic.    Neck:  Supple, Non-tender, No jugular venous distention.    Respiratory:  Lungs are clear to auscultation, Respirations are non-labored.    Cardiovascular:  Normal rate, Regular rhythm, No  edema.    Gastrointestinal:  Soft, Non-tender, Non-distended, Normal bowel sounds.       Vital Signs (last 24 hrs)_____  Last Charted___________  Temp Oral     36.5 DegC  (AUG 12 16:00)  Heart Rate Peripheral   65 bpm  (AUG 12 03:57)  Resp Rate         18 br/min  (AUG 12 16:00)  SBP      106 mmHg  (AUG 12 16:00)  DBP      62 mmHg  (AUG 12 16:00)  SpO2      99 %  (AUG 12 16:00)     Musculoskeletal:  Normal range of motion, Normal strength, No swelling.    Integumentary:  Warm, Dry.    Neurologic:  Alert, Oriented, No focal deficits.    Psychiatric:  Cooperative, Appropriate mood & affect.    Cognition and Speech:  Oriented, Speech clear and coherent.       Health Maintenance      Health Maintenance     Pending (in the next year)        Due            Alcohol Misuse Screening due  08/12/17  and every 1  year(s)           Colorectal Screening due  08/12/17  Variable frequency           Depression Screening due  08/12/17  and every 1  year(s)           HIV Screening due  08/12/17  and every 1  year(s)           Tetanus Vaccine due  08/12/17  and every 10  year(s)        Due In Future            Influenza Vaccine not due until  10/02/17  and every 1  year(s)           Body Mass Index Check not due until  10/10/17  and every 1  year(s)           Obesity Screening not due until  10/10/17  and every 1  year(s)     Satisfied (in the past 1 year)        Satisfied            Aspirin Therapy for CVD Prevention on  08/13/17.  Satisfied by Isaura Briceno MD           Blood Pressure Screening on  08/12/17.  Satisfied by Chanel Hernández C.N.A.           Body Mass Index Check on  10/10/16.  Satisfied by Shannan Martínez RN           Diabetes Screening on  08/13/17.  Satisfied by Isaura Briceno MD           Obesity Screening on  10/10/16.  Satisfied by Shannan Martínez RN          Review / Management   Results review:     Labs (Last four charted values)  Glucose              H 130 (AUG 12) 99 (AUG 12) H 192 (AUG 12) .       Impression and  Plan   1. Chronic systolic CHF:  He feels well.  He does not appear to be in any volume overload  2. Acute renal failure: improving.  diuretics and ace-inhibitor are on hold for now.  We are hydrating very slowly  3.  Hyperkalemia: resolved  4. elevated lft's:  willl check lipid panel, CPK, and hepatitis panel  5. macrocytic anemia: will check folate and B12  6.  History of DM2: stable at this time. will resume home meds

## 2022-10-19 ENCOUNTER — HOSPITAL ENCOUNTER (EMERGENCY)
Facility: HOSPITAL | Age: 62
Discharge: HOME | End: 2022-10-19
Attending: EMERGENCY MEDICINE
Payer: COMMERCIAL

## 2022-10-19 VITALS
TEMPERATURE: 99 F | BODY MASS INDEX: 25.76 KG/M2 | DIASTOLIC BLOOD PRESSURE: 88 MMHG | HEIGHT: 68 IN | RESPIRATION RATE: 18 BRPM | OXYGEN SATURATION: 99 % | HEART RATE: 88 BPM | WEIGHT: 170 LBS | SYSTOLIC BLOOD PRESSURE: 142 MMHG

## 2022-10-19 DIAGNOSIS — R10.9 ABDOMINAL PAIN: ICD-10-CM

## 2022-10-19 DIAGNOSIS — R10.13 ABDOMINAL PAIN, ACUTE, EPIGASTRIC: Primary | ICD-10-CM

## 2022-10-19 LAB
ALBUMIN SERPL-MCNC: 4.3 GM/DL (ref 3.4–4.8)
ALBUMIN/GLOB SERPL: 1.1 RATIO (ref 1.1–2)
ALP SERPL-CCNC: 95 UNIT/L (ref 40–150)
ALT SERPL-CCNC: 149 UNIT/L (ref 0–55)
APPEARANCE UR: CLEAR
AST SERPL-CCNC: 176 UNIT/L (ref 5–34)
BACTERIA #/AREA URNS AUTO: NORMAL /HPF
BASOPHILS # BLD AUTO: 0.03 X10(3)/MCL (ref 0–0.2)
BASOPHILS NFR BLD AUTO: 0.2 %
BILIRUB UR QL STRIP.AUTO: NEGATIVE MG/DL
BILIRUBIN DIRECT+TOT PNL SERPL-MCNC: 3.9 MG/DL
BNP BLD-MCNC: 62.1 PG/ML
BUN SERPL-MCNC: 12.6 MG/DL (ref 8.4–25.7)
CALCIUM SERPL-MCNC: 9.4 MG/DL (ref 8.8–10)
CHLORIDE SERPL-SCNC: 100 MMOL/L (ref 98–107)
CO2 SERPL-SCNC: 28 MMOL/L (ref 23–31)
COLOR UR AUTO: YELLOW
CREAT SERPL-MCNC: 0.82 MG/DL (ref 0.73–1.18)
EOSINOPHIL # BLD AUTO: 0 X10(3)/MCL (ref 0–0.9)
EOSINOPHIL NFR BLD AUTO: 0 %
ERYTHROCYTE [DISTWIDTH] IN BLOOD BY AUTOMATED COUNT: 12.7 % (ref 11.5–17)
GFR SERPLBLD CREATININE-BSD FMLA CKD-EPI: >60 MLS/MIN/1.73/M2
GLOBULIN SER-MCNC: 3.8 GM/DL (ref 2.4–3.5)
GLUCOSE SERPL-MCNC: 328 MG/DL (ref 82–115)
GLUCOSE UR QL STRIP.AUTO: >=1000 MG/DL
HCT VFR BLD AUTO: 41.9 % (ref 42–52)
HGB BLD-MCNC: 13.6 GM/DL (ref 14–18)
IMM GRANULOCYTES # BLD AUTO: 0.03 X10(3)/MCL (ref 0–0.04)
IMM GRANULOCYTES NFR BLD AUTO: 0.2 %
KETONES UR QL STRIP.AUTO: NEGATIVE MG/DL
LEUKOCYTE ESTERASE UR QL STRIP.AUTO: NEGATIVE UNIT/L
LIPASE SERPL-CCNC: 22 U/L
LYMPHOCYTES # BLD AUTO: 0.64 X10(3)/MCL (ref 0.6–4.6)
LYMPHOCYTES NFR BLD AUTO: 5 %
MCH RBC QN AUTO: 30 PG (ref 27–31)
MCHC RBC AUTO-ENTMCNC: 32.5 MG/DL (ref 33–36)
MCV RBC AUTO: 92.3 FL (ref 80–94)
MONOCYTES # BLD AUTO: 0.47 X10(3)/MCL (ref 0.1–1.3)
MONOCYTES NFR BLD AUTO: 3.7 %
NEUTROPHILS # BLD AUTO: 11.6 X10(3)/MCL (ref 2.1–9.2)
NEUTROPHILS NFR BLD AUTO: 90.9 %
NITRITE UR QL STRIP.AUTO: NEGATIVE
NRBC BLD AUTO-RTO: 0 %
PH UR STRIP.AUTO: 7.5 [PH]
PLATELET # BLD AUTO: 168 X10(3)/MCL (ref 130–400)
PMV BLD AUTO: 11.4 FL (ref 7.4–10.4)
POTASSIUM SERPL-SCNC: 4.5 MMOL/L (ref 3.5–5.1)
PROT SERPL-MCNC: 8.1 GM/DL (ref 5.8–7.6)
PROT UR QL STRIP.AUTO: NEGATIVE MG/DL
RBC # BLD AUTO: 4.54 X10(6)/MCL (ref 4.7–6.1)
RBC #/AREA URNS AUTO: NORMAL /HPF
RBC UR QL AUTO: ABNORMAL UNIT/L
SARS-COV-2 RDRP RESP QL NAA+PROBE: NEGATIVE
SODIUM SERPL-SCNC: 138 MMOL/L (ref 136–145)
SP GR UR STRIP.AUTO: 1.01
SQUAMOUS #/AREA URNS AUTO: NORMAL /HPF
TROPONIN I SERPL-MCNC: <0.01 NG/ML (ref 0–0.04)
UROBILINOGEN UR STRIP-ACNC: 2 MG/DL
WBC # SPEC AUTO: 12.7 X10(3)/MCL (ref 4.5–11.5)
WBC #/AREA URNS AUTO: NORMAL /HPF

## 2022-10-19 PROCEDURE — 84484 ASSAY OF TROPONIN QUANT: CPT | Performed by: EMERGENCY MEDICINE

## 2022-10-19 PROCEDURE — 36415 COLL VENOUS BLD VENIPUNCTURE: CPT | Performed by: EMERGENCY MEDICINE

## 2022-10-19 PROCEDURE — 96372 THER/PROPH/DIAG INJ SC/IM: CPT | Performed by: EMERGENCY MEDICINE

## 2022-10-19 PROCEDURE — 83880 ASSAY OF NATRIURETIC PEPTIDE: CPT | Performed by: EMERGENCY MEDICINE

## 2022-10-19 PROCEDURE — 80053 COMPREHEN METABOLIC PANEL: CPT | Performed by: EMERGENCY MEDICINE

## 2022-10-19 PROCEDURE — 25000003 PHARM REV CODE 250: Performed by: EMERGENCY MEDICINE

## 2022-10-19 PROCEDURE — 93010 EKG 12-LEAD: ICD-10-PCS | Mod: ,,, | Performed by: INTERNAL MEDICINE

## 2022-10-19 PROCEDURE — 63600175 PHARM REV CODE 636 W HCPCS: Performed by: EMERGENCY MEDICINE

## 2022-10-19 PROCEDURE — 96375 TX/PRO/DX INJ NEW DRUG ADDON: CPT

## 2022-10-19 PROCEDURE — 99285 EMERGENCY DEPT VISIT HI MDM: CPT | Mod: 25

## 2022-10-19 PROCEDURE — 96374 THER/PROPH/DIAG INJ IV PUSH: CPT

## 2022-10-19 PROCEDURE — 87635 SARS-COV-2 COVID-19 AMP PRB: CPT | Performed by: EMERGENCY MEDICINE

## 2022-10-19 PROCEDURE — 85025 COMPLETE CBC W/AUTO DIFF WBC: CPT | Performed by: EMERGENCY MEDICINE

## 2022-10-19 PROCEDURE — C9113 INJ PANTOPRAZOLE SODIUM, VIA: HCPCS | Performed by: EMERGENCY MEDICINE

## 2022-10-19 PROCEDURE — 93005 ELECTROCARDIOGRAM TRACING: CPT

## 2022-10-19 PROCEDURE — 93010 ELECTROCARDIOGRAM REPORT: CPT | Mod: ,,, | Performed by: INTERNAL MEDICINE

## 2022-10-19 PROCEDURE — 83690 ASSAY OF LIPASE: CPT | Performed by: EMERGENCY MEDICINE

## 2022-10-19 PROCEDURE — 81001 URINALYSIS AUTO W/SCOPE: CPT | Performed by: EMERGENCY MEDICINE

## 2022-10-19 RX ORDER — ONDANSETRON 2 MG/ML
4 INJECTION INTRAMUSCULAR; INTRAVENOUS
Status: DISCONTINUED | OUTPATIENT
Start: 2022-10-19 | End: 2022-10-19

## 2022-10-19 RX ORDER — ATORVASTATIN CALCIUM 20 MG/1
TABLET, FILM COATED ORAL
COMMUNITY
Start: 2022-05-03

## 2022-10-19 RX ORDER — MORPHINE SULFATE 4 MG/ML
4 INJECTION, SOLUTION INTRAMUSCULAR; INTRAVENOUS
Status: COMPLETED | OUTPATIENT
Start: 2022-10-19 | End: 2022-10-19

## 2022-10-19 RX ORDER — SODIUM CHLORIDE 9 MG/ML
1000 INJECTION, SOLUTION INTRAVENOUS
Status: COMPLETED | OUTPATIENT
Start: 2022-10-19 | End: 2022-10-19

## 2022-10-19 RX ORDER — ONDANSETRON 2 MG/ML
4 INJECTION INTRAMUSCULAR; INTRAVENOUS
Status: COMPLETED | OUTPATIENT
Start: 2022-10-19 | End: 2022-10-19

## 2022-10-19 RX ORDER — MORPHINE SULFATE 4 MG/ML
4 INJECTION, SOLUTION INTRAMUSCULAR; INTRAVENOUS
Status: DISCONTINUED | OUTPATIENT
Start: 2022-10-19 | End: 2022-10-19

## 2022-10-19 RX ORDER — APIXABAN 5 MG/1
5 TABLET, FILM COATED ORAL 2 TIMES DAILY
COMMUNITY
Start: 2022-08-09

## 2022-10-19 RX ORDER — LISINOPRIL 40 MG/1
40 TABLET ORAL DAILY
COMMUNITY
Start: 2022-10-04

## 2022-10-19 RX ORDER — AMLODIPINE BESYLATE 10 MG/1
10 TABLET ORAL DAILY
COMMUNITY
Start: 2022-08-07

## 2022-10-19 RX ORDER — PANTOPRAZOLE SODIUM 40 MG/10ML
40 INJECTION, POWDER, LYOPHILIZED, FOR SOLUTION INTRAVENOUS
Status: COMPLETED | OUTPATIENT
Start: 2022-10-19 | End: 2022-10-19

## 2022-10-19 RX ORDER — PANTOPRAZOLE SODIUM 40 MG/1
40 TABLET, DELAYED RELEASE ORAL DAILY
Qty: 30 TABLET | Refills: 0 | Status: SHIPPED | OUTPATIENT
Start: 2022-10-19

## 2022-10-19 RX ORDER — HYDROCODONE BITARTRATE AND ACETAMINOPHEN 5; 325 MG/1; MG/1
1 TABLET ORAL EVERY 6 HOURS PRN
Qty: 12 TABLET | Refills: 0 | Status: SHIPPED | OUTPATIENT
Start: 2022-10-19

## 2022-10-19 RX ORDER — CARVEDILOL 6.25 MG/1
6.25 TABLET ORAL 2 TIMES DAILY
COMMUNITY
Start: 2022-08-07

## 2022-10-19 RX ORDER — ONDANSETRON 4 MG/1
4 TABLET, ORALLY DISINTEGRATING ORAL EVERY 6 HOURS PRN
Qty: 10 TABLET | Refills: 0 | Status: SHIPPED | OUTPATIENT
Start: 2022-10-19

## 2022-10-19 RX ADMIN — MORPHINE SULFATE 4 MG: 4 INJECTION INTRAVENOUS at 07:10

## 2022-10-19 RX ADMIN — ONDANSETRON 4 MG: 2 INJECTION INTRAMUSCULAR; INTRAVENOUS at 07:10

## 2022-10-19 RX ADMIN — SODIUM CHLORIDE 1000 ML: 9 INJECTION, SOLUTION INTRAVENOUS at 09:10

## 2022-10-19 RX ADMIN — INSULIN HUMAN 6 UNITS: 100 INJECTION, SOLUTION PARENTERAL at 09:10

## 2022-10-19 RX ADMIN — PANTOPRAZOLE SODIUM 40 MG: 40 INJECTION, POWDER, LYOPHILIZED, FOR SOLUTION INTRAVENOUS at 10:10

## 2022-10-19 NOTE — Clinical Note
"Paco"Eliseo Chow was seen and treated in our emergency department on 10/19/2022.  He may return to work on 10/21/2022.       If you have any questions or concerns, please don't hesitate to call.      Laura Dwyer MD"

## 2022-10-19 NOTE — ED PROVIDER NOTES
Encounter Date: 10/19/2022       History     Chief Complaint   Patient presents with    Abdominal Pain     Abd pain since last night after supper. Epigastric area. Denies n/v/d     62-year-old male with a history of hypertension, hyperlipidemia, CVA, V-tach, presents to the emergency room with epigastric abdominal pain which started last night.  The pain has continued since last night, no nausea, vomiting, diarrhea.  The pain does not radiate, is constant, not coming and going.  He initially said he has not been able to eat, but then later said he did eat breakfast this morning.  He is drinking fluids.  No blood in his stool.  No history of cholecystitis, pancreatitis, GERD, or gastritis      Review of patient's allergies indicates:  No Known Allergies  Past Medical History:   Diagnosis Date    CHF (congestive heart failure)     CVA (cerebral vascular accident)     Diabetes mellitus     Hypertension     Mixed hyperlipidemia     Ventricular tachycardia      History reviewed. No pertinent surgical history.  History reviewed. No pertinent family history.  Social History     Tobacco Use    Smoking status: Never    Smokeless tobacco: Never     Review of Systems   Gastrointestinal:  Positive for abdominal pain.   All other systems reviewed and are negative.    Physical Exam     Initial Vitals [10/19/22 0706]   BP Pulse Resp Temp SpO2   (!) 144/87 87 16 98.6 °F (37 °C) 99 %      MAP       --         Physical Exam    Nursing note and vitals reviewed.  Constitutional: Vital signs are normal. He appears well-developed and well-nourished.   HENT:   Head: Normocephalic and atraumatic.   Eyes: Pupils are equal, round, and reactive to light.   Neck: Neck supple.   Cardiovascular:  Normal rate, regular rhythm and normal heart sounds.           Pulmonary/Chest: Breath sounds normal. No respiratory distress.   Abdominal: Abdomen is soft. He exhibits no distension and no mass. There is no abdominal tenderness. There is no rebound and  no guarding.   Musculoskeletal:         General: No edema.      Cervical back: Neck supple. No edema or erythema.     Neurological: He is alert and oriented to person, place, and time. GCS score is 15. GCS eye subscore is 4. GCS verbal subscore is 5. GCS motor subscore is 6.   Skin: Skin is warm and dry. Capillary refill takes less than 2 seconds.       ED Course   Procedures  Labs Reviewed   CBC WITH DIFFERENTIAL - Abnormal; Notable for the following components:       Result Value    WBC 12.7 (*)     RBC 4.54 (*)     Hgb 13.6 (*)     Hct 41.9 (*)     MCHC 32.5 (*)     MPV 11.4 (*)     Neut # 11.6 (*)     All other components within normal limits   COMPREHENSIVE METABOLIC PANEL - Abnormal; Notable for the following components:    Glucose Level 328 (*)     Protein Total 8.1 (*)     Globulin 3.8 (*)     Bilirubin Total 3.9 (*)     Alanine Aminotransferase 149 (*)     Aspartate Aminotransferase 176 (*)     All other components within normal limits   URINALYSIS, REFLEX TO URINE CULTURE - Abnormal; Notable for the following components:    Glucose, UA >=1000 (*)     Blood, UA Moderate (*)     Urobilinogen, UA 2.0 (*)     All other components within normal limits   LIPASE - Normal   TROPONIN I - Normal   SARS-COV-2 RNA AMPLIFICATION, QUAL - Normal    Narrative:     The IDNOW COVID-19 assay is a rapid molecular in vitro diagnostic test utilizing an isothermal nucleic acid amplification technology intended for the qualitative detection of nucleic acid from the SARS-CoV-2 viral RNA in direct nasal, nasopharyngeal or throat swabs from individuals who are suspected of COVID-19 by their healthcare provider.   B-TYPE NATRIURETIC PEPTIDE - Normal   URINALYSIS, MICROSCOPIC - Normal     EKG Readings: (Independently Interpreted)   Initial Reading: No STEMI. Rhythm: Normal Sinus Rhythm. Heart Rate: 87. ST Segments: Normal ST Segments. T Waves Flipped: III and AVF. Clinical Impression: Normal Sinus Rhythm Other Impression: Prolonged QT  with QTC of 476   ECG Results              EKG 12-lead (In process)  Result time 10/19/22 10:25:14      In process by Interface, Lab In [facility] (10/19/22 10:25:14)                   Narrative:    Test Reason : R10.9,    Vent. Rate : 087 BPM     Atrial Rate : 087 BPM     P-R Int : 166 ms          QRS Dur : 108 ms      QT Int : 396 ms       P-R-T Axes : 029 -16 001 degrees     QTc Int : 476 ms    Normal sinus rhythm  Minimal voltage criteria for LVH, may be normal variant  Nonspecific T wave abnormality  Prolonged QT  Abnormal ECG  No previous ECGs available    Referred By: [email or web address]   SELF           Confirmed By:                                   Imaging Results              CT Abdomen Pelvis  Without Contrast (Final result)  Result time 10/19/22 09:29:53      Final result by Rizwana Pimentel MD (10/19/22 09:29:53)                   Impression:      1. Multiple large calculi in the left renal collecting system, with mild hydronephrosis and surrounding inflammatory changes.  2. Increased density layering within the stomach may represent ingested material versus blood products.  3. Cholelithiasis.  4. Enlarged prostate gland.      Electronically signed by: Rizwana Pimentel  Date:    10/19/2022  Time:    09:29               Narrative:    EXAMINATION:  CT ABDOMEN PELVIS WITHOUT CONTRAST    CLINICAL HISTORY:  Abdominal pain, acute, nonlocalized;    TECHNIQUE:  CT imaging was performed of the abdomen and pelvis without intravenous contrast. Dose length product is 271 mGycm. Automatic exposure control, adjustment of mA/kV or iterative reconstruction technique was used to limit radiation dose.    COMPARISON:  None    FINDINGS:  Assessment of the visceral organs and vasculature is limited by the lack of IV contrast.    Liver/biliary: No concerning hepatic findings. Small calcified gallstones are noted.    Pancreas: Normal.    Spleen: Normal.    Adrenals: Normal.    Kidneys, ureters and bladder: There are multiple large  calculi in the left lower renal pole and renal pelvis, measuring up to 2.2 cm in size.  There is mild dilatation of the left renal pelvis with inflammatory changes surrounding the kidney and proximal left ureter.  Punctate renal calculi are seen on the right.    Reproductive organs: The prostate is enlarged.    Stomach/bowel: There is increased density layering within the stomach.  There is no bowel obstruction.  There is a moderate colonic stool volume.  The appendix is normal.    Lymph nodes: No pathologically enlarged lymph node identified with noncontrast technique.    Peritoneum: No ascites or free air.    Vessels: There are mild scattered vascular calcifications.    Abdominal wall: Normal.    Lung bases: No consolidation or pleural effusion.    Bones: No acute osseous findings.                                       Medications   morphine injection 4 mg (4 mg Intramuscular Given 10/19/22 0754)   ondansetron injection 4 mg (4 mg Intramuscular Given 10/19/22 0755)   0.9%  NaCl infusion (1,000 mLs Intravenous New Bag 10/19/22 0925)   insulin regular injection 6 Units 0.06 mL (6 Units Intravenous Given 10/19/22 0925)   pantoprazole injection 40 mg (40 mg Intravenous Given 10/19/22 1005)     Medical Decision Making:   Initial Assessment:   62-year-old male complains of epigastric discomfort constant since yesterday  Differential Diagnosis:   Differential diagnosis includes but is not limited to Gastritis, gastroenteritis, cholecystitis, pancreatitis, biliary colic, dietary indiscretion, gastroparesis  ED Management:  Patient was given morphine 4 mg and Zofran 4 mg IM.  He had completely room resolution of his symptoms.  His blood sugar came back elevated so we gave him IV fluids and insulin and also Protonix.  His workup is benign.  He does have food in his stomach which was read as possible blood products but he has had no vomiting, no dark stools, no history of GI bleed her GI ulcer.  At this time is stable for  discharge to follow-up with his PCP.  He has been ambulatory to the restroom, well appearing, pain free.  All questions were answered and reasons to return to the emergency room was discussed.                        Clinical Impression:   Final diagnoses:  [R10.9] Abdominal pain  [R10.13] Abdominal pain, acute, epigastric (Primary)        ED Disposition Condition    Discharge Stable          ED Prescriptions       Medication Sig Dispense Start Date End Date Auth. Provider    HYDROcodone-acetaminophen (NORCO) 5-325 mg per tablet Take 1 tablet by mouth every 6 (six) hours as needed for Pain. 12 tablet 10/19/2022 -- Laura Dwyer MD    ondansetron (ZOFRAN-ODT) 4 MG TbDL Take 1 tablet (4 mg total) by mouth every 6 (six) hours as needed (nausea). 10 tablet 10/19/2022 -- Laura Dwyer MD    pantoprazole (PROTONIX) 40 MG tablet Take 1 tablet (40 mg total) by mouth once daily. 30 tablet 10/19/2022 -- Laura Dwyer MD          Follow-up Information    None          Laura Dwyer MD  10/19/22 8026